# Patient Record
Sex: FEMALE
[De-identification: names, ages, dates, MRNs, and addresses within clinical notes are randomized per-mention and may not be internally consistent; named-entity substitution may affect disease eponyms.]

---

## 2022-10-05 ENCOUNTER — APPOINTMENT (OUTPATIENT)
Dept: PHYSICAL MEDICINE AND REHAB | Facility: CLINIC | Age: 22
End: 2022-10-05

## 2022-10-05 VITALS
OXYGEN SATURATION: 97 % | BODY MASS INDEX: 18.54 KG/M2 | HEIGHT: 66 IN | HEART RATE: 129 BPM | DIASTOLIC BLOOD PRESSURE: 87 MMHG | WEIGHT: 115.4 LBS | SYSTOLIC BLOOD PRESSURE: 130 MMHG

## 2022-10-05 DIAGNOSIS — Z78.9 OTHER SPECIFIED HEALTH STATUS: ICD-10-CM

## 2022-10-05 PROBLEM — Z00.00 ENCOUNTER FOR PREVENTIVE HEALTH EXAMINATION: Status: ACTIVE | Noted: 2022-10-05

## 2022-10-05 PROCEDURE — 99203 OFFICE O/P NEW LOW 30 MIN: CPT

## 2022-11-15 PROBLEM — Z78.9 NON-SMOKER: Status: ACTIVE | Noted: 2022-11-15

## 2022-11-15 PROBLEM — Z78.9 NO PERTINENT PAST MEDICAL HISTORY: Status: RESOLVED | Noted: 2022-11-15 | Resolved: 2022-11-15

## 2022-11-15 NOTE — HISTORY OF PRESENT ILLNESS
[FreeTextEntry1] : 10/05/2022 Ms. LÓPEZ KHAN is a very pleasant 21 year female who comes in for evaluation of Lower back pain that has been ongoing for 2 months  without any specific injury or inciting event. Patient has tried stretching which did help temporarily. The pain is located primarily Lower back/LEFT hip, non-radiating, intermittent and described as sharp. The pain is rated as 7/10 and ranges from 6-9/10. The patient's symptoms are aggravated by sitting upright, going up or down the stairs, walking, standing and alleviated by stretches . The patient works as a  which consists of sitting. The patient denies any night pain, numbness/tingling, weakness, or bowel/bladder dysfunction. The patient has no other complaints at this time.\par

## 2022-11-15 NOTE — ASSESSMENT
[FreeTextEntry1] : Impression:\par 1. Lumbago/Strain\par 2. LEFT GTPS\par \par The history and physical examination were reviewed. The diagnosis was discussed with the patient along with treatment options including physical therapy, oral medication, interventional spine procedures, and surgery; as well as alternative therapeutics such as acupuncture and massage. \par \par Plan:\par 1. Start PT with emphasis on HEP.\par 2. Activity Modification\par 3. Follow up 6-8 weeks. \par \par The patient expressed verbal understanding and is in agreement with the plan of care. All of the patient's questions and concerns were addressed to the best of my ability during today's visit.

## 2022-11-15 NOTE — PHYSICAL EXAM
[FreeTextEntry1] : LUMBAR\par GEN: AAOx3. NAD.\par LS ROM: Flexion, extension, side-bending, rotation, oblique extension all limited (+) axial Sx. \par HIP ROM: Full and pain B/L.\par PALP: (+) TTP DIffusely B-Paraspinals/QL and L-GTB.\par STRENGTH: 5/5 bilateral hip flexors, knee extensors, ankle dorsiflexors, long toe extensors, ankle plantar flexors, hip abductors.\par SENS: Grossly intact to LT BLE.\par STANCE: No Trendelenburg with single leg stance.\par GAIT: Non antalgic, normal reciprocating heel to toe\par SPECIAL: SLR/Slump (-) bilaterally. FADIR (-) B/L. ABEL (+) LEFT. Resisted Hip ABd (+) LEFT.